# Patient Record
Sex: FEMALE | Race: WHITE | NOT HISPANIC OR LATINO | Employment: STUDENT | URBAN - METROPOLITAN AREA
[De-identification: names, ages, dates, MRNs, and addresses within clinical notes are randomized per-mention and may not be internally consistent; named-entity substitution may affect disease eponyms.]

---
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
## 2019-02-04 ENCOUNTER — CLINICAL SUPPORT (OUTPATIENT)
Dept: OBSTETRICS AND GYNECOLOGY | Facility: CLINIC | Age: 19
End: 2019-02-04
Payer: COMMERCIAL

## 2019-03-04 ENCOUNTER — CLINICAL SUPPORT (OUTPATIENT)
Dept: OBSTETRICS AND GYNECOLOGY | Facility: CLINIC | Age: 19
End: 2019-03-04
Payer: COMMERCIAL

## 2019-03-04 DIAGNOSIS — Z23 NEED FOR HPV VACCINE: Primary | ICD-10-CM

## 2019-03-04 PROCEDURE — 90471 IMMUNIZATION ADMIN: CPT | Performed by: NURSE PRACTITIONER

## 2019-03-04 PROCEDURE — 90651 9VHPV VACCINE 2/3 DOSE IM: CPT | Performed by: NURSE PRACTITIONER

## 2019-07-03 PROBLEM — H52.13 MYOPIA OF BOTH EYES: Status: ACTIVE | Noted: 2018-12-17

## 2019-07-03 PROBLEM — E66.9 OBESITY: Status: ACTIVE | Noted: 2017-04-07

## 2019-07-03 PROBLEM — H52.222 REGULAR ASTIGMATISM, LEFT EYE: Status: ACTIVE | Noted: 2018-12-17

## 2019-07-03 NOTE — PROGRESS NOTES
Visit Date: 2019   Leia Lawler is 18 y.o. female presenting today for Follow-up (BP OCP Check)     Pt has hx significant for benign brain tumor, s/p removal, has seizures as result. On oxcarbazepine for seizure control. Was previously taking OCPs (Ogestrel - 50mcg estradiol) for painful periods and acne, however due to seizure meds, OCPs were not effective and she experienced daily BTB. Pt d/c OCPs, then had worsened acne and prolonged menses. Started contraceptive patch for control of menses/acne in 2018. Here for f/u. Pt states she tried the patch x 2mo, no relief of her sx. D/c the patch, now on nothing.    Menses are regular, however they either last 5 days and are very heavy or are light and last 10 days. Cramping isn't as bad as before, but it is still present.    The following have been reviewed and updated as appropriate in this visit:      BP (!) 90/58   LMP 2019   Menstrual History:  OB History      Para Term  AB Living    0 0 0 0 0 0    SAB TAB Ectopic Multiple Live Births    0 0 0 0 0         Patient's last menstrual period was 2019.       Medications:   Current Outpatient Prescriptions:   •  calcium carbonate/vitamin D3 (CALTRATE 600 + D ORAL), Take 1 tablet by mouth daily., Disp: , Rfl:   •  cetirizine (ZyrTEC) 10 mg tablet, Take 10 mg by mouth daily., Disp: , Rfl:   •  levothyroxine (SYNTHROID) 25 mcg tablet, Take 25 mcg by mouth daily., Disp: , Rfl:   •  multivitamin capsule, Take 1 capsule by mouth daily., Disp: , Rfl:   •  norethindrone-ethin estradiol (ORTHO-NOVUM , ,) 1-35 mg-mcg per tablet, Take 1 tablet by mouth daily., Disp: 84 tablet, Rfl: 3  •  OXcarbazepine (TRILEPTAL) 600 mg tablet, Take 600 mg by mouth 2 (two) times a day., Disp: , Rfl:     Allergies: is allergic to cefprozil and minocycline.     Past Medical History:  has a past medical history of Ganglioglioma of brain (CMS/HCC) (HCC) (); History of papilledema; Hypothyroidism; Migraine;  and Seizures (CMS/HCC) ([facility]) (2016). She also has no past medical history of Abnormal Pap smear of cervix; Asthma; Cancer (CMS/HCC) (HCC); Deep vein thrombosis (CMS/HCC) (HCC); Diabetes mellitus (CMS/HCC) ([facility]); Endometriosis; Fibroid; or Hypertension.  Past Surgical History:  has a past surgical history that includes Brain tumor excision (2013).  Family History: family history includes Breast cancer (age of onset: 46) in her paternal grandmother; Cancer (age of onset: 66) in her maternal grandfather; Colon cancer (age of onset: 61) in her maternal grandmother; Endometriosis in her mother; Kidney cancer in her father.  Social History:  reports that she has never smoked. She has never used smokeless tobacco. She reports that she does not drink alcohol or use drugs.  HPI  Review of Systems  Physical Exam   Constitutional: She is oriented to person, place, and time. She appears well-developed and well-nourished.   HENT:   Head: Normocephalic and atraumatic.   Eyes: Pupils are equal, round, and reactive to light.   Neck: Normal range of motion.   Pulmonary/Chest: Effort normal.   Musculoskeletal: She exhibits no edema.   Neurological: She is alert and oriented to person, place, and time.   Skin: Skin is warm and dry.   Psychiatric: She has a normal mood and affect.     Diagnoses and all orders for this visit:    Menorrhagia with regular cycle (Primary)    Dysmenorrhea    Encounter for initial prescription of contraceptive pills    Other orders  -     norethindrone-ethin estradiol (ORTHO-NOVUM 1/35, 28,) 1-35 mg-mcg per tablet; Take 1 tablet by mouth daily.    - Counseled pt on options for control of heavy, painful periods. Options discussed: higher dose COCs, Nuvaring, Kyleena/Mirena IUD. Given her current use of oxcarbazepine, efficacy of pills would be lower. Information/insurance sheet provided for IUD. Risks/benefits of each discussed.  - Pt would like to proceed with OCPs. Rx sent. RTO on winter break for f/u  visit.    Return if symptoms worsen or fail to improve, for follow up visit.  Tanesha Perez, DO

## 2019-07-05 ENCOUNTER — OFFICE VISIT (OUTPATIENT)
Dept: OBSTETRICS AND GYNECOLOGY | Facility: CLINIC | Age: 19
End: 2019-07-05
Payer: COMMERCIAL

## 2019-07-05 VITALS — DIASTOLIC BLOOD PRESSURE: 58 MMHG | SYSTOLIC BLOOD PRESSURE: 90 MMHG

## 2019-07-05 DIAGNOSIS — Z30.011 ENCOUNTER FOR INITIAL PRESCRIPTION OF CONTRACEPTIVE PILLS: ICD-10-CM

## 2019-07-05 DIAGNOSIS — N92.0 MENORRHAGIA WITH REGULAR CYCLE: Primary | ICD-10-CM

## 2019-07-05 DIAGNOSIS — N94.6 DYSMENORRHEA: ICD-10-CM

## 2019-07-05 PROCEDURE — 99213 OFFICE O/P EST LOW 20 MIN: CPT | Performed by: OBSTETRICS & GYNECOLOGY

## 2019-10-31 ENCOUNTER — TELEPHONE (OUTPATIENT)
Dept: OBSTETRICS AND GYNECOLOGY | Facility: CLINIC | Age: 19
End: 2019-10-31

## 2019-12-19 NOTE — PROGRESS NOTES
2019  Leia Lawler is a 19 y.o.   female who presents for annual exam.     Stopped OCPs in September. Was getting BTB daily. BTB resolved. Since then menses q2-6wks x 10d, heavy flow, mild cramping.    Sh: single; nursing student    GYN screening history:   Last pap: never    Gyn History:    Patient's last menstrual period was 2019.    Menses: as above    Gardasil: never, declines    The patient is not sexually active. Has never been.  Current contraception: abstinence    History of abnormal Pap smear: no  History of abnormal mammogram: no   History of cysts, fibroids, STIs, etc:  no      OB History:   OB History    Para Term  AB Living   0 0 0 0 0 0   SAB TAB Ectopic Multiple Live Births   0 0 0 0 0       Allergies: Cefprozil; Fosphenytoin; and Minocycline    Prior to Admission medications    Medication Sig Start Date End Date Taking? Authorizing Provider   calcium carbonate/vitamin D3 (CALTRATE 600 + D ORAL) Take 1 tablet by mouth daily.    ProviderAlyssia MD   cetirizine (ZyrTEC) 10 mg tablet Take 10 mg by mouth daily.    ProviderAlyssia MD   levothyroxine (SYNTHROID) 25 mcg tablet Take 25 mcg by mouth daily.    Alyssia Dueñas MD   multivitamin capsule Take 1 capsule by mouth daily.    Alyssia Dueñas MD   norethindrone-ethin estradiol (ORTHO-NOVUM , ,) 1-35 mg-mcg per tablet Take 1 tablet by mouth daily. 19  Tanesha Perez DO   OXcarbazepine (TRILEPTAL) 600 mg tablet Take 600 mg by mouth 2 (two) times a day.    ProviderAlyssia MD       Medical History:   Past Medical History:   Diagnosis Date   • Ganglioglioma of brain (CMS/HCC) ([facility])     benign, s/p removal in    • History of papilledema     from tumor or meds, resolved   • Hypothyroidism    • Migraine     without auras   • Seizures (CMS/HCC) ([facility])        Surgical History:   Past Surgical History:   Procedure Laterality Date   • BRAIN TUMOR EXCISION          Social History:   Social History     Socioeconomic History   • Marital status: Single     Spouse name: Not on file   • Number of children: Not on file   • Years of education: Not on file   • Highest education level: Not on file   Occupational History   • Not on file   Social Needs   • Financial resource strain: Not on file   • Food insecurity:     Worry: Not on file     Inability: Not on file   • Transportation needs:     Medical: Not on file     Non-medical: Not on file   Tobacco Use   • Smoking status: Never Smoker   • Smokeless tobacco: Never Used   Substance and Sexual Activity   • Alcohol use: No   • Drug use: No   • Sexual activity: Never     Partners: Male     Birth control/protection: Abstinence   Lifestyle   • Physical activity:     Days per week: Not on file     Minutes per session: Not on file   • Stress: Not on file   Relationships   • Social connections:     Talks on phone: Not on file     Gets together: Not on file     Attends Holiness service: Not on file     Active member of club or organization: Not on file     Attends meetings of clubs or organizations: Not on file     Relationship status: Not on file   • Intimate partner violence:     Fear of current or ex partner: Not on file     Emotionally abused: Not on file     Physically abused: Not on file     Forced sexual activity: Not on file   Other Topics Concern   • Not on file   Social History Narrative   • Not on file        Family History:   Family History   Problem Relation Age of Onset   • Breast cancer Paternal Grandmother 46   • Colon cancer Maternal Grandmother 61   • Cancer Maternal Grandfather 66        bladder   • Kidney cancer Biological Father    • Endometriosis Biological Mother        Review of Systems and Physical Exam  The following have been reviewed and updated as appropriate in this visit: Allergies  Meds  Problems       Visit Vitals  /70   Wt 88.5 kg (195 lb)   LMP 12/18/2019   BMI 35.96 kg/m²     HPI  Review of  Systems  Physical Exam   Constitutional: She is oriented to person, place, and time. She appears well-developed and well-nourished.   Genitourinary: Vagina normal and uterus normal. Body Habitus limits findings.Pelvic exam was performed with patient in lithotomy exam position.     External female genitalia normal.   Urethral meatus normal.   Urethra normal.   Normal bladder.   Vagina normal.   Cervix exam normal.  Uterus is normal. Uterine contour is regular. Uterus is midaxial.   Adnexa normal.   HENT:   Head: Normocephalic and atraumatic.   Eyes: Pupils are equal, round, and reactive to light.   Neck: Normal range of motion.   Pulmonary/Chest: Effort normal.   Abdominal: Soft. She exhibits no distension and no mass. There is no tenderness. There is no rebound and no guarding. No hernia.   Musculoskeletal: She exhibits no edema.   Neurological: She is alert and oriented to person, place, and time.   Skin: Skin is warm and dry.   Psychiatric: She has a normal mood and affect.   Vitals reviewed.        Assessment/Plan:  Diagnoses and all orders for this visit:    Well woman exam with routine gynecological exam  - Exam: pelvic exam wnl. Deferred breast exam as pt <20yo.  - Pap: due at 19yo  - Gardasil: declines  - Contraception: abstinence. Wants mirena IUD for control of heavy, painful menses.  - STI screening: Not indicated as pt has never been SA. Encouraged consistent condom use for STI prevention.    Menorrhagia with irregular cycle  - Failed CHC, likely due to seizure medication.  - Counseled on alternative options, including nexplanon, levonorgestrel IUD, depo-provera.   - Pt wants to proceed with mirena IUD. Risks, benefits discussed.     Encounter for insertion of intrauterine contraceptive device (IUD)  - Mirena IUD inserted today without trouble.     Negative pregnancy test  -     POCT urine pregnancy test      Return in about 1 month (around 1/23/2020) for IUD string check.  Tanesha Perez,  DO

## 2019-12-23 ENCOUNTER — OFFICE VISIT (OUTPATIENT)
Dept: OBSTETRICS AND GYNECOLOGY | Facility: CLINIC | Age: 19
End: 2019-12-23
Payer: COMMERCIAL

## 2019-12-23 VITALS — WEIGHT: 195 LBS | SYSTOLIC BLOOD PRESSURE: 122 MMHG | DIASTOLIC BLOOD PRESSURE: 70 MMHG | BODY MASS INDEX: 35.96 KG/M2

## 2019-12-23 DIAGNOSIS — Z32.02 NEGATIVE PREGNANCY TEST: ICD-10-CM

## 2019-12-23 DIAGNOSIS — N92.1 MENORRHAGIA WITH IRREGULAR CYCLE: ICD-10-CM

## 2019-12-23 DIAGNOSIS — Z01.419 WELL WOMAN EXAM WITH ROUTINE GYNECOLOGICAL EXAM: Primary | ICD-10-CM

## 2019-12-23 DIAGNOSIS — Z30.430 ENCOUNTER FOR INSERTION OF INTRAUTERINE CONTRACEPTIVE DEVICE (IUD): ICD-10-CM

## 2019-12-23 LAB — PREGNANCY TEST URINE, POC: NEGATIVE

## 2019-12-23 PROCEDURE — 58300 INSERT INTRAUTERINE DEVICE: CPT | Performed by: OBSTETRICS & GYNECOLOGY

## 2019-12-23 PROCEDURE — 99395 PREV VISIT EST AGE 18-39: CPT | Mod: 25 | Performed by: OBSTETRICS & GYNECOLOGY

## 2019-12-23 PROCEDURE — 81025 URINE PREGNANCY TEST: CPT | Performed by: OBSTETRICS & GYNECOLOGY

## 2019-12-23 NOTE — PROCEDURES
IUD Insertion Ambulatory  Date/Time: 12/23/2019 1:06 PM  Performed by: Tanesha Perez DO  Authorized by: Tanesha Perez DO     Consent:     Consent obtained:  Written    Consent given by:  Patient    Procedure risks and benefits discussed: yes      Patient questions answered: yes      Patient agrees, verbalizes understanding, and wants to proceed: yes      Educational handouts given: yes      Instructions and paperwork completed: yes    Universal protocol:     Patient states understanding of procedure being performed: yes      Relevant documents present and verified: yes      Required blood products, implants, devices, and special equipment available: yes    Procedure:     Pelvic exam performed: yes      Negative GC/chlamydia test: not done because pt never SA.      Negative urine pregnancy test: yes      Cervix cleaned and prepped: yes      Speculum placed in vagina: yes      Tenaculum applied to cervix: yes      Uterus sounded: yes      Uterus sound depth (cm):  7    IUD inserted with no complications: yes      IUD type:  Mirena    Strings trimmed: yes    Post-procedure:     Patient tolerated procedure well: yes      Patient will follow up after next period: yes      Estimated blood loss: no blood loss

## 2020-01-03 NOTE — PROGRESS NOTES
Visit Date: 2020   Leia Lawler is 19 y.o. female presenting today for Follow-up (string check)    Mirena IUD inserted for heavy, painful periods 19. Here for string check. Since insertion, she has had some mild cramping and minimal brown discharge. No other c/o.    The following have been reviewed and updated as appropriate in this visit: Problems       Visit Vitals  /70   Wt 87.1 kg (192 lb)   LMP 2019   BMI 35.40 kg/m²     Menstrual History:  OB History        0    Para   0    Term   0       0    AB   0    Living   0       SAB   0    TAB   0    Ectopic   0    Multiple   0    Live Births   0                Patient's last menstrual period was 2019.       Medications:   Current Outpatient Medications:   •  cetirizine (ZyrTEC) 10 mg tablet, Take 10 mg by mouth daily., Disp: , Rfl:   •  levonorgestrel (MIRENA) 20 mcg/24 hours (5 yrs) 52 mg intrauterine device, 1 each by intrauterine route once., Disp: , Rfl:   •  levothyroxine (SYNTHROID) 25 mcg tablet, Take 25 mcg by mouth daily., Disp: , Rfl:   •  OXcarbazepine (TRILEPTAL) 600 mg tablet, Take 600 mg by mouth 2 (two) times a day., Disp: , Rfl:     Allergies: is allergic to cefprozil; fosphenytoin; and minocycline.     Past Medical History:  has a past medical history of Ganglioglioma of brain (CMS/HCC) ([facility]) (), History of papilledema, Hypothyroidism, Migraine, and Seizures (CMS/HCC) ([facility]) (). She also has no past medical history of Abnormal Pap smear of cervix, Asthma, Cancer (CMS/HCC) ([facility]), Deep vein thrombosis (CMS/HCC) ([facility]), Diabetes mellitus (CMS/HCC) ([facility]), Endometriosis, Fibroid, or Hypertension.  Past Surgical History:  has a past surgical history that includes Brain tumor excision ().  Family History: family history includes Breast cancer (age of onset: 46) in her paternal grandmother; Cancer (age of onset: 66) in her maternal grandfather; Colon cancer (age of onset: 61) in her maternal grandmother;  Endometriosis in her biological mother; Kidney cancer in her biological father.  Social History:  reports that she has never smoked. She has never used smokeless tobacco. She reports that she does not drink alcohol or use drugs.      HPI  Review of Systems  Physical Exam   Constitutional: She is oriented to person, place, and time. She appears well-developed and well-nourished.   Genitourinary: Vagina normal. Pelvic exam was performed with patient in lithotomy exam position.     External female genitalia normal.   Urethral meatus normal.   Urethra normal.   Normal bladder.   Vagina normal.   Cervix exam normal.  Cervix exhibits visible IUD strings.   HENT:   Head: Normocephalic and atraumatic.   Eyes: Pupils are equal, round, and reactive to light.   Neck: Normal range of motion.   Pulmonary/Chest: Effort normal.   Musculoskeletal: She exhibits no edema.   Neurological: She is alert and oriented to person, place, and time.   Skin: Skin is warm and dry.   Psychiatric: She has a normal mood and affect.   Vitals reviewed.    Diagnoses and all orders for this visit:    IUD check up (Primary)    - IUD strings visualized.   - Continue mirena IUD.    Return in about 8 months (around 9/15/2020) for Annual visit.  Tanesha Perez, DO

## 2020-01-08 ENCOUNTER — OFFICE VISIT (OUTPATIENT)
Dept: OBSTETRICS AND GYNECOLOGY | Facility: CLINIC | Age: 20
End: 2020-01-08
Payer: COMMERCIAL

## 2020-01-08 VITALS — BODY MASS INDEX: 35.4 KG/M2 | WEIGHT: 192 LBS | SYSTOLIC BLOOD PRESSURE: 114 MMHG | DIASTOLIC BLOOD PRESSURE: 70 MMHG

## 2020-01-08 DIAGNOSIS — Z30.431 IUD CHECK UP: Primary | ICD-10-CM

## 2020-01-08 PROCEDURE — 99213 OFFICE O/P EST LOW 20 MIN: CPT | Performed by: OBSTETRICS & GYNECOLOGY

## 2020-06-01 ENCOUNTER — TELEPHONE (OUTPATIENT)
Dept: HEMATOLOGY/ONCOLOGY | Facility: HOSPITAL | Age: 20
End: 2020-06-01

## 2020-06-01 ENCOUNTER — CLINICAL SUPPORT (OUTPATIENT)
Dept: OTHER | Facility: CLINIC | Age: 20
End: 2020-06-01
Attending: NURSE PRACTITIONER
Payer: COMMERCIAL

## 2020-06-01 DIAGNOSIS — R50.9 FEVER, UNSPECIFIED FEVER CAUSE: ICD-10-CM

## 2020-06-01 DIAGNOSIS — R50.9 FEVER, UNSPECIFIED FEVER CAUSE: Primary | ICD-10-CM

## 2020-06-01 NOTE — TELEPHONE ENCOUNTER
Please schedule this patient for Covid 19 testing.  I have updated the patient's demographic information with the patient's contact information for scheduling.    Patient having symptoms?: yes  If yes, list symptoms:Aches     If N/A, this patient is scheduled for surgery/procedure.    The patient will be having procedure at: N/A   The scheduled date for the procedure is: n/a    This provider will be placing the order in Epic: no  If no, this provider was directed to fax the order to [phone]: Yes    Ordering provider (First - Last): dr. bryan  Provider Best Callback #:[phone]  Fax number (If provider wants results and does not use In Basket): [phone]     This patient is a [employer] Employee: YES/NO/NA: No  If yes: Hospital/Facility & Unit/Department & Job Title:

## 2020-06-02 NOTE — PROGRESS NOTES
Patient was processed today at [facility] drive-up clinic.  Pt denies any sort of medical distress today.  After identifying the patient, a nasopharyngeal sample was obtained and placed in viral transport medium.  Pt was given a post-collection education sheet and encouraged to self-isolate until they receive the results.  Pt directed to [facility] website for [facility] Privacy Practices.  Sample sent to the lab noted on the order and requisition.  Pt was without additional questions or concerns and was dispositioned from the testing area to home.

## 2020-06-03 LAB — SARS-COV-2 RNA RESP QL NAA+PROBE: NOT DETECTED

## 2020-07-31 ENCOUNTER — OFFICE VISIT (OUTPATIENT)
Dept: OBSTETRICS AND GYNECOLOGY | Facility: CLINIC | Age: 20
End: 2020-07-31
Payer: COMMERCIAL

## 2020-07-31 VITALS
BODY MASS INDEX: 34.96 KG/M2 | HEIGHT: 62 IN | WEIGHT: 190 LBS | DIASTOLIC BLOOD PRESSURE: 80 MMHG | SYSTOLIC BLOOD PRESSURE: 120 MMHG

## 2020-07-31 DIAGNOSIS — Z11.3 ROUTINE SCREENING FOR STI (SEXUALLY TRANSMITTED INFECTION): ICD-10-CM

## 2020-07-31 DIAGNOSIS — N92.1 BREAKTHROUGH BLEEDING ASSOCIATED WITH INTRAUTERINE DEVICE (IUD): Primary | ICD-10-CM

## 2020-07-31 DIAGNOSIS — Z97.5 BREAKTHROUGH BLEEDING ASSOCIATED WITH INTRAUTERINE DEVICE (IUD): Primary | ICD-10-CM

## 2020-07-31 PROCEDURE — 99213 OFFICE O/P EST LOW 20 MIN: CPT | Performed by: OBSTETRICS & GYNECOLOGY

## 2020-07-31 RX ORDER — AMMONIUM LACTATE 12 G/100G
LOTION TOPICAL
COMMUNITY

## 2020-07-31 NOTE — PROGRESS NOTES
"Visit Date: 2020   Leia Lawler is 19 y.o. female presenting today for IUD Dec. still spotting    Since insertion, didn't have any bleeding for 1-2 months, then started again after that. Bleeding occurs monthly, few times per month, can last up to a few weeks. Comes randomly. Flow is light, mostly brown discharge.    The following have been reviewed and updated as appropriate in this visit: Tobacco  Allergies  Meds  Problems  Med Hx  Surg Hx  Fam Hx  Soc Hx        Visit Vitals  /80   Ht 1.575 m (5' 2\")   Wt 86.2 kg (190 lb)   BMI 34.75 kg/m²     Menstrual History:  OB History        0    Para   0    Term   0       0    AB   0    Living   0       SAB   0    TAB   0    Ectopic   0    Multiple   0    Live Births   0                No LMP recorded. Patient has had an implant.       Medications:   Current Outpatient Medications:   •  ammonium lactate (LAC-HYDRIN) 12 % lotion, AmLactin 12 % lotion  APPLY TO ARMS TWICE DAILY AS DIRECTED, Disp: , Rfl:   •  levonorgestrel (MIRENA) 20 mcg/24 hours (5 yrs) 52 mg intrauterine device, 1 each by intrauterine route once., Disp: , Rfl:   •  levothyroxine (SYNTHROID) 25 mcg tablet, Take 25 mcg by mouth daily., Disp: , Rfl:   •  OXcarbazepine (TRILEPTAL) 600 mg tablet, Take 600 mg by mouth 2 (two) times a day., Disp: , Rfl:   •  cetirizine (ZyrTEC) 10 mg tablet, Take 10 mg by mouth daily., Disp: , Rfl:     Allergies: is allergic to cefprozil; fosphenytoin; and minocycline.     Past Medical History:  has a past medical history of Ganglioglioma of brain (CMS/HCC) (), History of papilledema, Hypothyroidism, Migraine, and Seizures (CMS/HCC) (2016). She also has no past medical history of Abnormal Pap smear of cervix, Asthma, Cancer (CMS/HCC), Deep vein thrombosis (CMS/HCC), Diabetes mellitus (CMS/HCC), Endometriosis, Fibroid, or Hypertension.  Past Surgical History:  has a past surgical history that includes Brain tumor excision ().  Family History: " family history includes Breast cancer (age of onset: 46) in her paternal grandmother; Cancer (age of onset: 66) in her maternal grandfather; Colon cancer (age of onset: 61) in her maternal grandmother; Endometriosis in her biological mother; Kidney cancer in her biological father.  Social History:  reports that she has never smoked. She has never used smokeless tobacco. She reports that she does not drink alcohol or use drugs.      HPI  Review of Systems  Physical Exam   Constitutional: She is oriented to person, place, and time. She appears well-developed and well-nourished.   Genitourinary: Vagina normal and uterus normal. Pelvic exam was performed with patient in lithotomy exam position.     External female genitalia normal.   Urethral meatus normal.   Urethra normal.   Normal bladder.   Vagina normal. No bleeding in the vagina.   Cervix exam normal.  Cervix exhibits visible IUD strings.   Uterus is normal. Uterine contour is regular. Uterus is anteverted.   Adnexa normal.   HENT:   Head: Normocephalic and atraumatic.   Eyes: Pupils are equal, round, and reactive to light.   Neck: Normal range of motion.   Pulmonary/Chest: Effort normal.   Abdominal: Soft. She exhibits no distension and no mass. There is no tenderness. There is no rebound and no guarding. No hernia.   Musculoskeletal: She exhibits no edema.   Neurological: She is alert and oriented to person, place, and time.   Skin: Skin is warm and dry.   Psychiatric: She has a normal mood and affect.   Vitals reviewed.    Diagnoses and all orders for this visit:    Breakthrough bleeding associated with intrauterine device (IUD) (Primary)  - Exam wnl. Strings visualized.  - GC/CT sent.  -     US PELVIS TRANSABDOMINAL & TRANSVAGINAL; Future  - Likely nl bleeding with mirena IUD.    Routine screening for STI (sexually transmitted infection)  -     Chlamydia/GC RNA:ThinPrep/Urine/Swab    Return in about 1 year (around 7/31/2021), or if symptoms worsen or fail to  improve, for Annual visit.  Tanesha Perez, DO

## 2020-08-05 ENCOUNTER — HOSPITAL ENCOUNTER (OUTPATIENT)
Dept: RADIOLOGY | Age: 20
Discharge: HOME | End: 2020-08-05
Attending: OBSTETRICS & GYNECOLOGY
Payer: COMMERCIAL

## 2020-08-05 DIAGNOSIS — Z97.5 BREAKTHROUGH BLEEDING ASSOCIATED WITH INTRAUTERINE DEVICE (IUD): ICD-10-CM

## 2020-08-05 DIAGNOSIS — N92.1 BREAKTHROUGH BLEEDING ASSOCIATED WITH INTRAUTERINE DEVICE (IUD): ICD-10-CM

## 2020-08-05 PROCEDURE — 76830 TRANSVAGINAL US NON-OB: CPT

## 2021-01-18 ENCOUNTER — IMMUNIZATIONS (OUTPATIENT)
Dept: FAMILY MEDICINE CLINIC | Facility: HOSPITAL | Age: 21
End: 2021-01-18

## 2021-01-18 DIAGNOSIS — Z23 ENCOUNTER FOR IMMUNIZATION: Primary | ICD-10-CM

## 2021-01-18 PROCEDURE — 0011A SARS-COV-2 / COVID-19 MRNA VACCINE (MODERNA) 100 MCG: CPT

## 2021-01-18 PROCEDURE — 91301 SARS-COV-2 / COVID-19 MRNA VACCINE (MODERNA) 100 MCG: CPT

## 2021-02-15 ENCOUNTER — IMMUNIZATIONS (OUTPATIENT)
Dept: FAMILY MEDICINE CLINIC | Facility: HOSPITAL | Age: 21
End: 2021-02-15

## 2021-02-15 DIAGNOSIS — Z23 ENCOUNTER FOR IMMUNIZATION: Primary | ICD-10-CM

## 2021-02-15 PROCEDURE — 0012A SARS-COV-2 / COVID-19 MRNA VACCINE (MODERNA) 100 MCG: CPT

## 2021-02-15 PROCEDURE — 91301 SARS-COV-2 / COVID-19 MRNA VACCINE (MODERNA) 100 MCG: CPT

## 2021-10-28 ENCOUNTER — IMMUNIZATIONS (OUTPATIENT)
Dept: FAMILY MEDICINE CLINIC | Facility: MEDICAL CENTER | Age: 21
End: 2021-10-28

## 2021-10-28 DIAGNOSIS — Z23 ENCOUNTER FOR IMMUNIZATION: Primary | ICD-10-CM

## 2022-03-31 ENCOUNTER — TELEPHONE (OUTPATIENT)
Dept: OBSTETRICS AND GYNECOLOGY | Facility: CLINIC | Age: 22
End: 2022-03-31
Payer: COMMERCIAL

## 2022-03-31 ENCOUNTER — OFFICE VISIT (OUTPATIENT)
Dept: OBSTETRICS AND GYNECOLOGY | Facility: CLINIC | Age: 22
End: 2022-03-31
Payer: COMMERCIAL

## 2022-03-31 VITALS — WEIGHT: 210.6 LBS | DIASTOLIC BLOOD PRESSURE: 82 MMHG | BODY MASS INDEX: 38.52 KG/M2 | SYSTOLIC BLOOD PRESSURE: 126 MMHG

## 2022-03-31 DIAGNOSIS — Z30.432 ENCOUNTER FOR IUD REMOVAL: Primary | ICD-10-CM

## 2022-03-31 PROCEDURE — 58301 REMOVE INTRAUTERINE DEVICE: CPT | Performed by: NURSE PRACTITIONER

## 2022-03-31 PROCEDURE — 99999 PR OFFICE/OUTPT VISIT,PROCEDURE ONLY: CPT | Performed by: NURSE PRACTITIONER

## 2022-03-31 RX ORDER — ASPIRIN 325 MG
TABLET, DELAYED RELEASE (ENTERIC COATED) ORAL
COMMUNITY
Start: 2022-01-19

## 2022-03-31 NOTE — PROCEDURES
IUD Removal Ambulatory    Date/Time: 3/31/2022 4:53 PM  Performed by: Celia Calixto CRNP  Authorized by: Celia Calixto CRNP     Consent:     Consent obtained:  Verbal and written    Consent given by:  Patient    Procedure risks and benefits discussed: yes      Patient questions answered: yes      Patient agrees, verbalizes understanding, and wants to proceed: yes    Procedure:     Removed with no complications: yes      Estimated blood loss: minimal  Comments:      Notified pt that she can get pregnant now, to use condoms

## 2022-04-04 NOTE — PROGRESS NOTES
"Subjective      Patient ID: Leia Lawler is a 21 y.o.       No LMP recorded (lmp unknown). Patient has had an implant.    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   Genitourinary:      Urethra, vagina, cervix and urethral meatus normal.     External female genitalia normal.   Urethral meatus normal.   Urethra normal.   Vagina normal.   Cervix exam normal.Rectovaginal exam normal. Abdominal:      General: There is no distension.      Palpations: Abdomen is soft.   Skin:     General: Skin is warm.      Findings: No rash.           Assessment/Plan     IUD removed without difficulty: see procedure note  Offered another form of birth control, pt will think about this, but will use condoms right now  RTO: AV, already scheduled for 5/3/2022 with Dr. CALIX and will discuss possible starting another form of birth control      "
I was immediately available to provide supervision and direction for the care of the patient.  
100

## 2022-04-29 NOTE — PROGRESS NOTES
5/3/2022  Leia Lawler is a 21 y.o.   female who presents for annual exam.     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  Marital status: single    GYN screening history:   Last pap: never  Last mammo: never  Last colonoscopy: never  Last DEXA: never      Gyn History:    Patient's last menstrual period was 2022 (exact date).    Menses: had minimal spotting with mirena in place, had postcoital bleeding with mirena in place, was removed 3/31/22, had menses 4/3/22 x 10 days, heavy flow x 6d, had to change pad q4-5hr, no IMB, no pelvic pain    Gardasil: never, declines    The patient is sexually active. Prefers men.  Current contraception: condoms    History of abnormal Pap smear: no  History of abnormal mammogram: no   History of cysts, fibroids, STIs, etc:  no      OB History:   OB History    Para Term  AB Living   0 0 0 0 0 0   SAB IAB Ectopic Multiple Live Births   0 0 0 0 0       Allergies: Cefprozil, Fosphenytoin, and Minocycline    Prior to Admission medications    Medication Sig Start Date End Date Taking? Authorizing Provider   ammonium lactate (LAC-HYDRIN) 12 % lotion AmLactin 12 % lotion   APPLY TO ARMS TWICE DAILY AS DIRECTED    Alyssia Dueñas MD   cetirizine (ZyrTEC) 10 mg tablet Take 10 mg by mouth daily.    Alyssia Dueñas MD   cholecalciferol (VITAMIN D3) 1,250 mcg (50,000 unit) capsule TAKE 1 CAPSULE BY MOUTH ONE TIME PER WEEK 22   Alyssia Dueñas MD   levonorgestrel (MIRENA) 20 mcg/24 hours (5 yrs) 52 mg intrauterine device 1 each by intrauterine route once. 19   Alyssia Dueñas MD   levothyroxine (SYNTHROID) 25 mcg tablet Take 25 mcg by mouth daily.    Alyssia Dueñas MD   OXcarbazepine (TRILEPTAL) 600 mg tablet Take 600 mg by mouth 2 (two) times a day.    Alyssia Dueñas MD       Medical History:   Past Medical History:   Diagnosis Date   • Ganglioglioma of brain (CMS/[facility])      "benign, s/p removal in 2013   • History of papilledema     from tumor or meds, resolved   • Hypothyroidism    • Migraine     without auras   • Seizures (CMS/HCC) 2016       Surgical History:   Past Surgical History:   Procedure Laterality Date   • BRAIN TUMOR EXCISION  2013   • WISDOM TOOTH EXTRACTION         Social History:   Social History     Socioeconomic History   • Marital status: Single     Spouse name: None   • Number of children: None   • Years of education: None   • Highest education level: None   Occupational History   • Occupation: RN   Tobacco Use   • Smoking status: Never Smoker   • Smokeless tobacco: Never Used   Vaping Use   • Vaping Use: Never used   Substance and Sexual Activity   • Alcohol use: No   • Drug use: No   • Sexual activity: Yes     Partners: Male     Birth control/protection: Condom        Family History:   Family History   Problem Relation Age of Onset   • Breast cancer Paternal Grandmother 46   • Colon cancer Maternal Grandmother 61   • Cancer Maternal Grandfather 66        bladder   • Kidney cancer Biological Father    • Breast cancer Biological Mother 55   • Endometriosis Biological Mother    • No Known Problems Biological Brother        Review of Systems and Physical Exam  The following have been reviewed and updated as appropriate in this visit:  Tobacco  Allergies  Meds  Problems  Med Hx  Surg Hx  Fam Hx  Soc   Hx        Visit Vitals  /78   Ht 1.575 m (5' 2\")   Wt 94.7 kg (208 lb 12.8 oz)   LMP 04/03/2022 (Exact Date)   BMI 38.19 kg/m²     HPI  Review of Systems  Physical Exam  Constitutional:       Appearance: Normal appearance. She is well-developed.   Genitourinary:      Pelvic exam was performed with patient in the lithotomy position.      Urethra, vagina, cervix, uterus and urethral meatus normal.   Pelvic exam was performed with patient in lithotomy exam position.     External female genitalia normal.   Urethral meatus normal.   Urethra normal.   Normal " bladder.   Vagina normal.   Cervix exam normal.  Uterus is normal.   Adnexa normal. HENT:      Head: Normocephalic and atraumatic.   Eyes:      Pupils: Pupils are equal, round, and reactive to light.   Pulmonary:      Effort: Pulmonary effort is normal.   Chest:   Breasts: Breasts are symmetrical.      Right: Normal. No swelling, bleeding, inverted nipple, mass, nipple discharge, skin change, tenderness or axillary adenopathy.      Left: Normal. No swelling, bleeding, inverted nipple, mass, nipple discharge, skin change, tenderness or axillary adenopathy.       Abdominal:      General: There is no distension.      Palpations: Abdomen is soft. There is no mass.      Tenderness: There is no abdominal tenderness. There is no guarding or rebound.      Hernia: No hernia is present.   Musculoskeletal:      Cervical back: Normal range of motion.      Right lower leg: No edema.      Left lower leg: No edema.   Lymphadenopathy:      Upper Body:      Right upper body: No axillary adenopathy.      Left upper body: No axillary adenopathy.   Neurological:      General: No focal deficit present.      Mental Status: She is alert and oriented to person, place, and time.   Skin:     General: Skin is warm and dry.   Psychiatric:         Mood and Affect: Mood normal.         Behavior: Behavior normal.   Vitals reviewed.           Assessment/Plan:  Diagnoses and all orders for this visit:    Well woman exam with routine gynecological exam     - Exam: wnl  - PAP W/REFLEX HR HPV AND CT/NG  - Gardasil: declines  - Contraception: condoms. Wants to start NuvaRing. No contraindications. Counseled on use. Rx sent: etonogestreL-ethinyl estradioL (NUVARING) 0.12-0.015 mg/24 hr vaginal ring; Insert 1 each into the vagina every 28 (twentyeight) days. Insert vaginally and leave in for 3 consecutive weeks, then remove for 1 week.  - Discussed breast awareness.    Routine screening for STI (sexually transmitted infection)  -     PAP W/REFLEX HR HPV  AND CT/NG  - Not interested in serum STI screening at this moment.    Family history of breast cancer  - Mother dx with breast CA in her 50s. Plans genetic testing soon.  - Discussed self breast exams.  - If mother's genetic testing is negative, will start with yearly mammography at 41yo or younger if she develops any changes with her breasts.    Return in about 3 months (around 8/3/2022) for Contraception follow up, blood pressure check.  Tanesha Perez, DO

## 2022-05-03 ENCOUNTER — OFFICE VISIT (OUTPATIENT)
Dept: OBSTETRICS AND GYNECOLOGY | Facility: CLINIC | Age: 22
End: 2022-05-03
Payer: COMMERCIAL

## 2022-05-03 VITALS
BODY MASS INDEX: 38.42 KG/M2 | WEIGHT: 208.8 LBS | SYSTOLIC BLOOD PRESSURE: 118 MMHG | HEIGHT: 62 IN | DIASTOLIC BLOOD PRESSURE: 78 MMHG

## 2022-05-03 DIAGNOSIS — Z80.3 FAMILY HISTORY OF BREAST CANCER: ICD-10-CM

## 2022-05-03 DIAGNOSIS — Z01.419 WELL WOMAN EXAM WITH ROUTINE GYNECOLOGICAL EXAM: Primary | ICD-10-CM

## 2022-05-03 DIAGNOSIS — Z11.3 ROUTINE SCREENING FOR STI (SEXUALLY TRANSMITTED INFECTION): ICD-10-CM

## 2022-05-03 PROCEDURE — 99395 PREV VISIT EST AGE 18-39: CPT | Performed by: OBSTETRICS & GYNECOLOGY

## 2022-05-03 PROCEDURE — 3008F BODY MASS INDEX DOCD: CPT | Performed by: OBSTETRICS & GYNECOLOGY

## 2022-05-03 RX ORDER — ETONOGESTREL AND ETHINYL ESTRADIOL VAGINAL RING .015; .12 MG/D; MG/D
1 RING VAGINAL
Qty: 3 EACH | Refills: 3 | Status: SHIPPED | OUTPATIENT
Start: 2022-05-03 | End: 2023-05-03

## 2022-05-11 LAB
C TRACH RRNA SPEC QL NAA+PROBE: NOT DETECTED
CLINICAL INFO: ABNORMAL
CYTO CVX: ABNORMAL
CYTOLOGIST CVX/VAG CYTO: ABNORMAL
CYTOLOGY CMNT CVX/VAG CYTO-IMP: ABNORMAL
DATE OF PREVIOUS PAP: ABNORMAL
DATE PREVIOUS BX: ABNORMAL
GEN CATEG CVX/VAG CYTO-IMP: ABNORMAL
HPV E6+E7 MRNA CVX QL NAA+PROBE: NOT DETECTED
LMP START DATE: ABNORMAL
N GONORRHOEA RRNA SPEC QL NAA+PROBE: NOT DETECTED
PATHOLOGIST CVX/VAG CYTO: ABNORMAL
QST (ALWAYS MESSAGE): ABNORMAL
QUEST COMMENT (PAP): ABNORMAL
SPECIMEN SOURCE CVX/VAG CYTO: ABNORMAL
STAT OF ADQ CVX/VAG CYTO-IMP: ABNORMAL

## 2022-05-12 ENCOUNTER — TELEPHONE (OUTPATIENT)
Dept: OBSTETRICS AND GYNECOLOGY | Facility: CLINIC | Age: 22
End: 2022-05-12
Payer: COMMERCIAL

## 2022-05-12 NOTE — TELEPHONE ENCOUNTER
Spoke with pt re: pap result: ASCUS, negative HPV. Given negative HPV, this is reassuring result. Reviewed ASCCP guidelines with pt. Can repeat pap 3 years given negative HPV, although guidelines recommend repeat in 1yr if HPV unknown. Pt prefers repeat pap in 1yr. Will repeat at next annual visit. Pt also notes she hasn't gotten a period since 4/3, has had two negative home UPT. Using condoms for BCM. Hasn't started NuvaRing yet. She can start NuvaRing right away, doesn't need to wait for period, however she prefers to wait to start NuvaRing to ensure she is not amenorrheic. Pt to notify me if she doesn't get period in 3mo. RTO prn or for annual.

## 2022-06-20 ENCOUNTER — NURSE TRIAGE (OUTPATIENT)
Dept: OBSTETRICS AND GYNECOLOGY | Facility: CLINIC | Age: 22
End: 2022-06-20
Payer: COMMERCIAL

## 2022-06-20 ENCOUNTER — APPOINTMENT (OUTPATIENT)
Dept: LAB | Facility: CLINIC | Age: 22
End: 2022-06-20

## 2022-06-20 ENCOUNTER — APPOINTMENT (OUTPATIENT)
Dept: URGENT CARE | Facility: CLINIC | Age: 22
End: 2022-06-20

## 2022-06-20 DIAGNOSIS — Z02.1 PHYSICAL EXAM, PRE-EMPLOYMENT: Primary | ICD-10-CM

## 2022-06-20 DIAGNOSIS — Z02.1 PHYSICAL EXAM, PRE-EMPLOYMENT: ICD-10-CM

## 2022-06-20 PROCEDURE — 86480 TB TEST CELL IMMUN MEASURE: CPT

## 2022-06-20 PROCEDURE — 86765 RUBEOLA ANTIBODY: CPT

## 2022-06-20 PROCEDURE — 86735 MUMPS ANTIBODY: CPT

## 2022-06-20 PROCEDURE — 86787 VARICELLA-ZOSTER ANTIBODY: CPT

## 2022-06-20 PROCEDURE — 36415 COLL VENOUS BLD VENIPUNCTURE: CPT

## 2022-06-20 PROCEDURE — 86762 RUBELLA ANTIBODY: CPT

## 2022-06-20 NOTE — TELEPHONE ENCOUNTER
Spoke to pt     Pt reported:  IUD removed 3/31  No period for 6 weeks     Period Started 5/13   Full bleed for 1-20 days then light to spotting  Now: around day 30 full period again   Never started Nuvaring    Pt agreed to be seen Wednesday     No pain   No Sob     Pt will call back with worsening symptoms

## 2022-06-21 LAB
MEV IGG SER QL: NORMAL
MUV IGG SER QL: NORMAL
RUBV IGG SERPL IA-ACNC: 133.4 IU/ML
VZV IGG SER IA-ACNC: NORMAL

## 2022-06-21 NOTE — PROGRESS NOTES
Visit Date: 2022   Leia Lawler is 21 y.o. female presenting today for AUB    Cycle hx: h/o monthly cycles w 10 day bleeding interval     IUD removed 3/31 (felt it and hair loss)   LMP: , UPT neg   Bled for 20 days   Resumed bleeding: beginning of      Dx w hypothyroid,  Admits to compliance issues w meds   Gain 15 lbs since 2021     Pain:  No   SA: yes no c/o using condoms     H/O BTB w OC use   US done 2020: normal       The following have been reviewed and updated as appropriate in this visit:         There were no vitals taken for this visit.  Menstrual History:  OB History        0    Para   0    Term   0       0    AB   0    Living   0       SAB   0    IAB   0    Ectopic   0    Multiple   0    Live Births   0                No LMP recorded.       Medications:   Current Outpatient Medications:   •  ammonium lactate (LAC-HYDRIN) 12 % lotion, AmLactin 12 % lotion  APPLY TO ARMS TWICE DAILY AS DIRECTED, Disp: , Rfl:   •  cetirizine (ZyrTEC) 10 mg tablet, Take 10 mg by mouth daily., Disp: , Rfl:   •  cholecalciferol (VITAMIN D3) 1,250 mcg (50,000 unit) capsule, TAKE 1 CAPSULE BY MOUTH ONE TIME PER WEEK, Disp: , Rfl:   •  etonogestreL-ethinyl estradioL (NUVARING) 0.12-0.015 mg/24 hr vaginal ring, Insert 1 each into the vagina every 28 (twentyeight) days. Insert vaginally and leave in for 3 consecutive weeks, then remove for 1 week., Disp: 3 each, Rfl: 3  •  levothyroxine (SYNTHROID) 25 mcg tablet, Take 25 mcg by mouth daily., Disp: , Rfl:   •  OXcarbazepine (TRILEPTAL) 600 mg tablet, Take 600 mg by mouth 2 (two) times a day., Disp: , Rfl:     Allergies: is allergic to cefprozil, fosphenytoin, and minocycline.     Past Medical History:  has a past medical history of Ganglioglioma of brain (CMS/[facility]) (), History of papilledema, Hypothyroidism, Migraine, and Seizures (CMS/[facility]) (2016).    She has no past medical history of Abnormal Pap smear of cervix, Asthma, Cancer (CMS/[facility]), Deep  vein thrombosis (CMS/HCC), Diabetes mellitus (CMS/HCC), Endometriosis, Fibroid, or Hypertension.  Past Surgical History:  has a past surgical history that includes Brain tumor excision (2013) and [place] tooth extraction.  Family History: family history includes Breast cancer (age of onset: 46) in her paternal grandmother; Breast cancer (age of onset: 55) in her biological mother; Cancer (age of onset: 66) in her maternal grandfather; Colon cancer (age of onset: 61) in her maternal grandmother; Endometriosis in her biological mother; Kidney cancer in her biological father; No Known Problems in her biological brother.  Social History:   Social History     Tobacco Use   • Smoking status: Never Smoker   • Smokeless tobacco: Never Used   Vaping Use   • Vaping Use: Never used   Substance Use Topics   • Alcohol use: No   • Drug use: No         HPI  Review of Systems   Genitourinary: Positive for irregular menses.     Physical Exam  Genitourinary:      Vagina, cervix and uterus normal.     External female genitalia normal.   Vagina normal.   Cervix exam normal.  Uterus is normal.   Adnexa normal.      Exam: normal  AUB: begin aygestin taper  B/W for TSH and poss PCOS ordered  To begin Ring w aygestin withdraw bleed        No follow-ups on file.  TACHO Rivera

## 2022-06-22 ENCOUNTER — APPOINTMENT (OUTPATIENT)
Dept: LAB | Age: 22
End: 2022-06-22
Attending: NURSE PRACTITIONER
Payer: COMMERCIAL

## 2022-06-22 ENCOUNTER — OFFICE VISIT (OUTPATIENT)
Dept: OBSTETRICS AND GYNECOLOGY | Facility: CLINIC | Age: 22
End: 2022-06-22
Payer: COMMERCIAL

## 2022-06-22 VITALS
HEIGHT: 62 IN | BODY MASS INDEX: 38.64 KG/M2 | DIASTOLIC BLOOD PRESSURE: 70 MMHG | SYSTOLIC BLOOD PRESSURE: 118 MMHG | WEIGHT: 210 LBS

## 2022-06-22 DIAGNOSIS — N93.9 ABNORMAL UTERINE AND VAGINAL BLEEDING, UNSPECIFIED: ICD-10-CM

## 2022-06-22 DIAGNOSIS — N93.9 ABNORMAL UTERINE BLEEDING (AUB): Primary | ICD-10-CM

## 2022-06-22 LAB
GAMMA INTERFERON BACKGROUND BLD IA-ACNC: 0.02 IU/ML
M TB IFN-G BLD-IMP: NEGATIVE
M TB IFN-G CD4+ BCKGRND COR BLD-ACNC: -0.01 IU/ML
M TB IFN-G CD4+ BCKGRND COR BLD-ACNC: 0 IU/ML
MITOGEN IGNF BCKGRD COR BLD-ACNC: >10 IU/ML

## 2022-06-22 PROCEDURE — 30099997 SPECIMEN PROCESSING

## 2022-06-22 PROCEDURE — 3008F BODY MASS INDEX DOCD: CPT | Performed by: NURSE PRACTITIONER

## 2022-06-22 PROCEDURE — 99213 OFFICE O/P EST LOW 20 MIN: CPT | Performed by: NURSE PRACTITIONER

## 2022-06-22 RX ORDER — NORETHINDRONE 5 MG/1
TABLET ORAL
Qty: 30 TABLET | Refills: 0 | Status: SHIPPED | OUTPATIENT
Start: 2022-06-22

## 2022-06-23 ENCOUNTER — TELEPHONE (OUTPATIENT)
Dept: OBSTETRICS AND GYNECOLOGY | Facility: CLINIC | Age: 22
End: 2022-06-23
Payer: COMMERCIAL

## 2022-06-23 NOTE — TELEPHONE ENCOUNTER
Spoke w pt Reviewed results so far. Pharm is waiting on aygestin delivery. Pt states bleeding is slowing down. I will call next week w final b/w result s

## 2022-06-27 LAB
B-HCG SERPL-ACNC: <3 MIU/ML
ERYTHROCYTE [DISTWIDTH] IN BLOOD BY AUTOMATED COUNT: 12.3 % (ref 11–15)
ESTRADIOL SERPL-MCNC: 55 PG/ML
FSH SERPL-ACNC: 5.4 MIU/ML
HCT VFR BLD AUTO: 41 % (ref 35–45)
HGB BLD-MCNC: 14.1 G/DL (ref 11.7–15.5)
LH SERPL-ACNC: 16.5 MIU/ML
MCH RBC QN AUTO: 32.7 PG (ref 27–33)
MCHC RBC AUTO-ENTMCNC: 34.4 G/DL (ref 32–36)
MCV RBC AUTO: 95.1 FL (ref 80–100)
PLATELET # BLD AUTO: 276 THOUSAND/UL (ref 140–400)
PMV BLD REES-ECKER: 11.2 FL (ref 7.5–12.5)
PROGEST SERPL-MCNC: 0.7 NG/ML
RBC # BLD AUTO: 4.31 MILLION/UL (ref 3.8–5.1)
TESTOST FREE SERPL-MCNC: 17.3 PG/ML (ref 0.1–6.4)
TESTOST SERPL-MCNC: 92 NG/DL (ref 2–45)
TSH SERPL-ACNC: 2.46 MIU/L
WBC # BLD AUTO: 7 THOUSAND/UL (ref 3.8–10.8)

## 2022-06-28 ENCOUNTER — TELEPHONE (OUTPATIENT)
Dept: OBSTETRICS AND GYNECOLOGY | Facility: CLINIC | Age: 22
End: 2022-06-28
Payer: COMMERCIAL

## 2022-06-28 DIAGNOSIS — E28.2 PCOS (POLYCYSTIC OVARIAN SYNDROME): Primary | ICD-10-CM

## 2022-06-28 NOTE — TELEPHONE ENCOUNTER
Spoke w pt. Reviewed blood work results. Discussed tx/natural hx of PCOS.  Plan: Fasting insulin and 2 hr GTT ordered.  Continue w aygestin and begin ring w next withdraw bleed.

## 2022-06-29 RX ORDER — NORETHINDRONE 5 MG/1
TABLET ORAL
Qty: 30 TABLET | Refills: 0 | OUTPATIENT
Start: 2022-06-29

## 2022-06-29 NOTE — TELEPHONE ENCOUNTER
Needs 90 day supply      Medicine Refill Request    Last Office: 6/22/2022   Last Consult Visit: Visit date not found  Last Telemedicine Visit: Visit date not found    Next Appointment: 8/10/2022      Current Outpatient Medications:   •  ammonium lactate (LAC-HYDRIN) 12 % lotion, AmLactin 12 % lotion  APPLY TO ARMS TWICE DAILY AS DIRECTED, Disp: , Rfl:   •  cetirizine (ZyrTEC) 10 mg tablet, Take 10 mg by mouth daily., Disp: , Rfl:   •  cholecalciferol (VITAMIN D3) 1,250 mcg (50,000 unit) capsule, TAKE 1 CAPSULE BY MOUTH ONE TIME PER WEEK, Disp: , Rfl:   •  etonogestreL-ethinyl estradioL (NUVARING) 0.12-0.015 mg/24 hr vaginal ring, Insert 1 each into the vagina every 28 (twentyeight) days. Insert vaginally and leave in for 3 consecutive weeks, then remove for 1 week., Disp: 3 each, Rfl: 3  •  levothyroxine (SYNTHROID) 25 mcg tablet, Take 25 mcg by mouth daily., Disp: , Rfl:   •  norethindrone (AYGESTIN) 5 mg tablet, Take 2 tabs po BID for 3 days. Decrease to 1 tab po bid for 3 days. Decrease to 1 tab daily for 3 days, Disp: 30 tablet, Rfl: 0  •  OXcarbazepine (TRILEPTAL) 600 mg tablet, Take 600 mg by mouth 2 (two) times a day., Disp: , Rfl:       BP Readings from Last 3 Encounters:   06/22/22 118/70   05/03/22 118/78   03/31/22 126/82       Recent Lab results:  No results found for: CHOL, No results found for: HDL, No results found for: LDLCALC, No results found for: TRIG     No results found for: GLUCOSE, No results found for: HGBA1C      No results found for: CREATININE    Lab Results   Component Value Date    TSH 2.46 06/22/2022

## 2022-07-12 ENCOUNTER — TELEPHONE (OUTPATIENT)
Dept: OBSTETRICS AND GYNECOLOGY | Facility: CLINIC | Age: 22
End: 2022-07-12
Payer: COMMERCIAL

## 2022-07-14 ENCOUNTER — TELEPHONE (OUTPATIENT)
Dept: OBSTETRICS AND GYNECOLOGY | Facility: CLINIC | Age: 22
End: 2022-07-14
Payer: COMMERCIAL

## 2022-07-14 NOTE — TELEPHONE ENCOUNTER
Spoke with pt and stopped Nuvaring two days ago and started to bleed heavier, going through a tampon every 3 hours. Pt reassured to give this a week and if the bleeding does not significantly slow down or stop by 7 days, to call back. Given bleeding precautions and when to call back. Can consider restarting the Ring once bleeding stops.     ----- Message from Varsha Alejo MA sent at 7/14/2022  4:52 PM EDT -----  Regarding: FW: Birth Control    ----- Message -----  From: Leia Lawler  Sent: 7/14/2022   4:50 PM EDT  To: [facility] Clinical Support P  Subject: Birth Control                                    Hi Cora,     Today I felt like my period was worse. I found myself bleeding through my tampons multiple times not long after inserting them. I was wondering if there was another doctor you could talk to about this issue? Or is Dr. Perez is back? I am just concerned and would like to get this under control.     Thank you,   Leia Lawler

## 2022-07-17 LAB
GLUCOSE 1H P 75 G GLC PO SERPL-MCNC: 140 MG/DL
GLUCOSE 2H P 75 G GLC PO SERPL-MCNC: 112 MG/DL
GLUCOSE P FAST SERPL-MCNC: 90 MG/DL (ref 65–99)
INSULIN SERPL-ACNC: 9.9 UIU/ML
SERVICE CMNT-IMP: NORMAL

## 2022-07-18 ENCOUNTER — TELEPHONE (OUTPATIENT)
Dept: OBSTETRICS AND GYNECOLOGY | Facility: CLINIC | Age: 22
End: 2022-07-18
Payer: COMMERCIAL

## 2022-07-18 ENCOUNTER — TELEPHONE (OUTPATIENT)
Dept: OBGYN CLINIC | Facility: CLINIC | Age: 22
End: 2022-07-18

## 2022-08-12 ENCOUNTER — TELEPHONE (OUTPATIENT)
Dept: DERMATOLOGY | Facility: CLINIC | Age: 22
End: 2022-08-12

## 2022-08-29 NOTE — PATIENT INSTRUCTIONS
Pap every 3 years if normal, STI testing as indicated, exercise most days of week, obtain appropriate diet and hydration, Calcium 1000mg + 600 vit D daily,   Discussed dx PCOS implications symptom management Her biggest issue has been hair loss some cystic acne  Will start Spironolactone daily  Discussed Trileptal can interactand decrease effectiveness of OCP Could lead to preg as well as issue with BTB  Could trial with cont use of condoms  Hopes to come off Trileptal in Dec Would switch to OCP at that time     F/u in Spring for annual gyn

## 2022-08-29 NOTE — PROGRESS NOTES
Assessment/Plan:    Pap every 3 years if normal, STI testing as indicated, exercise most days of week, obtain appropriate diet and hydration, Calcium 1000mg + 600 vit D daily,   Discussed dx PCOS biggest issue has been hair loss some cystic acne  Will start Spironolactone daily  Discussed Trileptal can interactand decrease effectiveness of OCP Could lead to preg as well as issue with BTB  Could trial with cont use of condoms  Hopes to come off Trileptal in Dec Would switch to OCP at that time  F/u in Spring for annual gyn      Diagnoses and all orders for this visit:    PCOS (polycystic ovarian syndrome)    Other orders  -     ammonium lactate (LAC-HYDRIN) 12 % lotion; AmLactin 12 % lotion   APPLY TO ARMS TWICE DAILY AS DIRECTED  -     cetirizine (ZyrTEC) 10 mg tablet; Take 10 mg by mouth  -     ibuprofen (MOTRIN) 200 mg tablet; Take by mouth  -     levothyroxine 25 mcg tablet; TAKE 1 TABLET BY MOUTH EVERY MORNING MONDAY-FRIDAY AND 2 TABLETS ON SAT & SUNDAY  -     OXcarbazepine (TRILEPTAL) 600 mg tablet; Take 600 mg by mouth every 12 (twelve) hours  -     Retin-A Micro Pump 0 06 % GEL; APPLY SPARINGLY AND RUB IN WELL TO AFFECTED AREA ONCE DAILY AT BEDTIME  Subjective:      Patient ID: Mercdees Patel is a 24 y o  female  New pt here for consult regarding recent dx PCOS  Has been following with gyn on [facility] moved to [place] working for [facility]  She prev had IUD she  became SA and could feel it increase cramping and then dev  hair thinning so removed  She had a  normal period then had next period bled x 2 1/2 mos She took aygestin and when stopped her period returned Due to prolonged menses it was suspected PCOS  Labs confirmatory LMP  8/16/2022 normal period Met with derm for hairloss and rec anti androgen Spironolactone or OCP praneeth  On Trileptal for seizures  She was dx brain tumor then underwent resection   She then had a seizure they thought due to scar tissue   Neurologist  has discussed getting her off in December UTD annual gyn had in March PAP slightly abnormal was told would repeat in 1 year [facility] [facility]       The following portions of the patient's history were reviewed and updated as appropriate: allergies, current medications, past family history, past medical history, past social history, past surgical history and problem list     Review of Systems   Constitutional: Negative for chills, fatigue and fever  Hairloss   Gastrointestinal: Negative for abdominal pain, constipation and diarrhea  Genitourinary: Positive for menstrual problem  Negative for frequency and pelvic pain  Skin:        acne   Psychiatric/Behavioral: Negative for dysphoric mood  The patient is not nervous/anxious  Objective:      /65 (BP Location: Left arm, Patient Position: Sitting, Cuff Size: Large)   Ht 5' 1" (1 549 m)   Wt 96 3 kg (212 lb 3 2 oz)   LMP 08/16/2022 (Exact Date)   BMI 40 09 kg/m²          Physical Exam  Constitutional:       Appearance: Normal appearance  HENT:      Head: Normocephalic and atraumatic  Neurological:      General: No focal deficit present  Mental Status: She is alert and oriented to person, place, and time     Psychiatric:         Mood and Affect: Mood normal          Behavior: Behavior normal

## 2022-08-30 ENCOUNTER — OFFICE VISIT (OUTPATIENT)
Dept: OBGYN CLINIC | Facility: CLINIC | Age: 22
End: 2022-08-30

## 2022-08-30 VITALS
DIASTOLIC BLOOD PRESSURE: 65 MMHG | SYSTOLIC BLOOD PRESSURE: 109 MMHG | WEIGHT: 212.2 LBS | HEIGHT: 61 IN | BODY MASS INDEX: 40.06 KG/M2

## 2022-08-30 DIAGNOSIS — E28.2 PCOS (POLYCYSTIC OVARIAN SYNDROME): Primary | ICD-10-CM

## 2022-08-30 RX ORDER — OXCARBAZEPINE 600 MG/1
600 TABLET, FILM COATED ORAL EVERY 12 HOURS
COMMUNITY
Start: 2022-08-18

## 2022-08-30 RX ORDER — IBUPROFEN 200 MG
TABLET ORAL
COMMUNITY

## 2022-08-30 RX ORDER — AMMONIUM LACTATE 12 G/100G
LOTION TOPICAL
COMMUNITY

## 2022-08-30 RX ORDER — SPIRONOLACTONE 50 MG/1
50 TABLET, FILM COATED ORAL DAILY
Qty: 30 TABLET | Refills: 11 | Status: SHIPPED | OUTPATIENT
Start: 2022-08-30 | End: 2022-09-26

## 2022-08-30 RX ORDER — TRETINOIN 0.6 MG/G
GEL TOPICAL
COMMUNITY
Start: 2022-08-17

## 2022-08-30 RX ORDER — CETIRIZINE HYDROCHLORIDE 10 MG/1
10 TABLET ORAL
COMMUNITY

## 2022-08-30 RX ORDER — LEVOTHYROXINE SODIUM 0.03 MG/1
TABLET ORAL
COMMUNITY
Start: 2022-07-18

## 2022-09-26 DIAGNOSIS — E28.2 PCOS (POLYCYSTIC OVARIAN SYNDROME): ICD-10-CM

## 2022-09-26 RX ORDER — SPIRONOLACTONE 50 MG/1
TABLET, FILM COATED ORAL
Qty: 90 TABLET | Refills: 4 | Status: SHIPPED | OUTPATIENT
Start: 2022-09-26

## 2023-03-03 ENCOUNTER — NEW PATIENT (OUTPATIENT)
Dept: URBAN - METROPOLITAN AREA CLINIC 6 | Facility: CLINIC | Age: 23
End: 2023-03-03

## 2023-03-03 DIAGNOSIS — Z01.00: ICD-10-CM

## 2023-03-03 PROCEDURE — 92015 DETERMINE REFRACTIVE STATE: CPT

## 2023-03-03 PROCEDURE — 92004 COMPRE OPH EXAM NEW PT 1/>: CPT

## 2023-03-03 ASSESSMENT — VISUAL ACUITY
OS_SC: 20/25-1
OD_SC: 20/25
OS_SC: J1
OD_SC: J1

## 2023-05-08 NOTE — PROGRESS NOTES
Assessment/Plan:  Discussed Praneeth better antiandrogenic activity but pt would like to try alt OCP  On Spironolactone with some improvement can increase dose to bid   F/u 3 months for annual gyn        Diagnoses and all orders for this visit:    PCOS (polycystic ovarian syndrome)  -     spironolactone (ALDACTONE) 50 mg tablet; Take 1 tablet (50 mg total) by mouth 2 (two) times a day    Encounter for prescription of oral contraceptives  -     norethindrone-ethinyl estradiol (Loestrin Fe 1/20) 1-20 MG-MCG per tablet; Take 1 tablet by mouth daily          Subjective:      Patient ID: Fanny Neri is a 20 y o  female  Here to discuss birth control Last seen 8/30/2022 H/o PCOS Had  been following with gyn [facility] moved to [place] working for [employer]  [facility] She prev had IUD she  became SA and could feel it increase cramping and then dev  hair thinning so removed  She had a  normal period then had next period bled x 2 1/2 mos She took aygestin and when stopped her period returned again with prolonged bleeding Labs confirmatory for PCOS   Met with derm for hairloss and rec anti androgen Spironolactone or OCP praneeth  On Trileptal for seizures  She was dx brain tumor then underwent resection   She then had a seizure they thought due to scar tissue  Neurologist  has discussed getting her off in December UTD annual gyn had in March PAP slightly abnormal was told would repeat in 1 year Works [facility] Off trileptal 1 1/2 weeks Took a year to have seizure when d/cd last time already more energy spirinolactone 50 daily is helping Does not want praneeth         The following portions of the patient's history were reviewed and updated as appropriate: allergies, current medications, past family history, past medical history, past social history, past surgical history and problem list     Review of Systems      Objective:      /80 (BP Location: Right arm, Patient Position: Sitting, Cuff Size: Standard) "  Ht 5' 1\" (1 549 m)   Wt 94 8 kg (209 lb)   LMP 05/03/2023   BMI 39 49 kg/m²          Physical Exam  Constitutional:       Appearance: Normal appearance  HENT:      Head: Normocephalic and atraumatic  Neurological:      General: No focal deficit present  Mental Status: She is alert and oriented to person, place, and time     Psychiatric:         Mood and Affect: Mood normal          Behavior: Behavior normal          "

## 2023-05-09 ENCOUNTER — OFFICE VISIT (OUTPATIENT)
Dept: OBGYN CLINIC | Facility: CLINIC | Age: 23
End: 2023-05-09

## 2023-05-09 ENCOUNTER — TELEPHONE (OUTPATIENT)
Dept: OBGYN CLINIC | Facility: CLINIC | Age: 23
End: 2023-05-09

## 2023-05-09 VITALS
DIASTOLIC BLOOD PRESSURE: 80 MMHG | BODY MASS INDEX: 39.46 KG/M2 | WEIGHT: 209 LBS | HEIGHT: 61 IN | SYSTOLIC BLOOD PRESSURE: 120 MMHG

## 2023-05-09 DIAGNOSIS — E28.2 PCOS (POLYCYSTIC OVARIAN SYNDROME): Primary | ICD-10-CM

## 2023-05-09 DIAGNOSIS — Z30.011 ENCOUNTER FOR PRESCRIPTION OF ORAL CONTRACEPTIVES: ICD-10-CM

## 2023-05-09 RX ORDER — DROSPIRENONE AND ETHINYL ESTRADIOL 0.02-3(28)
1 KIT ORAL DAILY
Qty: 84 TABLET | Refills: 0 | Status: SHIPPED | OUTPATIENT
Start: 2023-05-09

## 2023-05-09 RX ORDER — SPIRONOLACTONE 50 MG/1
50 TABLET, FILM COATED ORAL 2 TIMES DAILY
Qty: 180 TABLET | Refills: 0 | Status: SHIPPED | OUTPATIENT
Start: 2023-05-09 | End: 2024-05-08

## 2023-05-09 RX ORDER — NORETHINDRONE ACETATE AND ETHINYL ESTRADIOL 1MG-20(21)
1 KIT ORAL DAILY
Qty: 84 TABLET | Refills: 0 | Status: SHIPPED | OUTPATIENT
Start: 2023-05-09 | End: 2023-05-09 | Stop reason: ALTCHOICE

## 2023-05-09 NOTE — TELEPHONE ENCOUNTER
Reviewed Paulina's message with Alicia Rivera   She plans to use the spironolactone in addition to the [facility]

## 2023-05-09 NOTE — TELEPHONE ENCOUNTER
Patient called stating she was in this morning to discuss other OCP options  She was thinking when she got home and wanted to try Rosie OCP instead as it is recommended with individual with PCOS  She request to switch her OCP to [facility] and please sent it to [facility] in [place] as on file  Since she will be switching to [facility], she will not need Spironolactone  She request a call once the script is sent  Bernice Kc please address

## 2023-05-09 NOTE — PATIENT INSTRUCTIONS
Discussed Rosie better antiandrogenic activity but pt would like to try alt OCP     On Spironolactone with some improvement can increase dose to bid   F/u 3 months for annual gyn

## 2023-07-25 DIAGNOSIS — Z30.011 ENCOUNTER FOR PRESCRIPTION OF ORAL CONTRACEPTIVES: ICD-10-CM

## 2023-07-26 RX ORDER — DROSPIRENONE AND ETHINYL ESTRADIOL 0.02-3(28)
1 KIT ORAL DAILY
Qty: 84 TABLET | Refills: 0 | Status: SHIPPED | OUTPATIENT
Start: 2023-07-26 | End: 2023-09-07 | Stop reason: DRUGHIGH

## 2023-08-04 DIAGNOSIS — E28.2 PCOS (POLYCYSTIC OVARIAN SYNDROME): ICD-10-CM

## 2023-08-08 RX ORDER — SPIRONOLACTONE 50 MG/1
50 TABLET, FILM COATED ORAL 2 TIMES DAILY
Qty: 180 TABLET | Refills: 0 | Status: SHIPPED | OUTPATIENT
Start: 2023-08-08

## 2023-09-07 ENCOUNTER — ANNUAL EXAM (OUTPATIENT)
Dept: OBGYN CLINIC | Facility: CLINIC | Age: 23
End: 2023-09-07
Payer: COMMERCIAL

## 2023-09-07 VITALS
DIASTOLIC BLOOD PRESSURE: 66 MMHG | WEIGHT: 212 LBS | BODY MASS INDEX: 39.01 KG/M2 | HEIGHT: 62 IN | SYSTOLIC BLOOD PRESSURE: 112 MMHG

## 2023-09-07 DIAGNOSIS — Z12.4 ENCOUNTER FOR PAPANICOLAOU SMEAR FOR CERVICAL CANCER SCREENING: ICD-10-CM

## 2023-09-07 DIAGNOSIS — E28.2 PCOS (POLYCYSTIC OVARIAN SYNDROME): ICD-10-CM

## 2023-09-07 DIAGNOSIS — Z13.0 SCREENING FOR ENDOCRINE, METABOLIC AND IMMUNITY DISORDER: ICD-10-CM

## 2023-09-07 DIAGNOSIS — Z13.228 SCREENING FOR ENDOCRINE, METABOLIC AND IMMUNITY DISORDER: ICD-10-CM

## 2023-09-07 DIAGNOSIS — Z13.29 SCREENING FOR ENDOCRINE, METABOLIC AND IMMUNITY DISORDER: ICD-10-CM

## 2023-09-07 DIAGNOSIS — Z30.011 ENCOUNTER FOR PRESCRIPTION OF ORAL CONTRACEPTIVES: ICD-10-CM

## 2023-09-07 DIAGNOSIS — Z01.419 ENCOUNTER FOR GYNECOLOGICAL EXAMINATION WITHOUT ABNORMAL FINDING: Primary | ICD-10-CM

## 2023-09-07 DIAGNOSIS — Z11.3 SCREEN FOR STD (SEXUALLY TRANSMITTED DISEASE): ICD-10-CM

## 2023-09-07 PROCEDURE — 99395 PREV VISIT EST AGE 18-39: CPT | Performed by: NURSE PRACTITIONER

## 2023-09-07 RX ORDER — DROSPIRENONE AND ETHINYL ESTRADIOL 0.03MG-3MG
1 KIT ORAL DAILY
Qty: 84 TABLET | Refills: 3 | Status: SHIPPED | OUTPATIENT
Start: 2023-09-07

## 2023-09-07 NOTE — PATIENT INSTRUCTIONS
Pap every 3 years if normal, STI testing as indicated, exercise most days of week, obtain appropriate diet and hydration, Calcium 1000mg + 600 vit D daily. Annual mammogram starting at age 36, monthly breast self exam.  Bleeding first week of pill pack will switch to mahamed slightly higher dose and 7 placebos to allow more of period placebo week   Hypothyroid will check TSH   Call with cont issue  PCOS on Spironolactone through derm for hairloss.  That in combination with Rosie can elevate K+ level will check chem panel

## 2023-09-07 NOTE — PROGRESS NOTES
Assessment/Plan:  Pap every 3 years if normal, STI testing as indicated, exercise most days of week, obtain appropriate diet and hydration, Calcium 1000mg + 600 vit D daily. Annual mammogram starting at age 35, monthly breast self exam.  Bleeding first week of pill pack will switch to orestes slightly higher dose and 7 placebos to allow more of period placebo week   Hypothyroid will check TSH   Call with cont issue  PCOS on Spironolactone through derm for hairloss. That in combination with Praneeth can elevate K+ level will check chem panel        Diagnoses and all orders for this visit:    Encounter for gynecological examination without abnormal finding  -     Thinprep Tis Pap Reflex HPV mRNA E6/E7, Chlamydia/N.gonorrhoeae    PCOS (polycystic ovarian syndrome)    Encounter for Papanicolaou smear for cervical cancer screening  -     Thinprep Tis Pap Reflex HPV mRNA E6/E7, Chlamydia/N.gonorrhoeae    Screen for STD (sexually transmitted disease)  -     Thinprep Tis Pap Reflex HPV mRNA E6/E7, Chlamydia/N.gonorrhoeae    Encounter for prescription of oral contraceptives  -     drospirenone-ethinyl estradiol (ORESTES) 3-0.03 MG per tablet; Take 1 tablet by mouth daily May take continuous active pills eliminate placebo    Screening for endocrine, metabolic and immunity disorder  -     CBC and Platelet; Future  -     TSH, 3rd generation with Free T4 reflex; Future  -     Comprehensive metabolic panel; Future  -     CBC and Platelet  -     TSH, 3rd generation with Free T4 reflex  -     Comprehensive metabolic panel    Other orders  -     Liquid-based pap, screening          Subjective:      Patient ID: Justin Galavn is a 20 y.o. female. Here for annual gyn and to discuss BC H/O PCOS with irreg to prolonged bleeding  Followed with derm for hairloss and rec anti androgen Spironolactone or OCP praneeth On spironolactone bid  On Trileptal for seizures. She was dx brain tumor then underwent resection .   She then had a seizure they thought due to scar tissue. Trial coming off again Last time tried had a seizure 1 year later she decided to start praneeth. Has been getting period end of placebos and lasting 8 days of new pack [facility]  PAP 5/2022 ASCUS neg HPV for repeat today       The following portions of the patient's history were reviewed and updated as appropriate: allergies, current medications, past family history, past medical history, past social history, past surgical history and problem list.    Review of Systems   Constitutional: Negative for fatigue and unexpected weight change. Gastrointestinal: Negative for abdominal distention, abdominal pain, constipation and diarrhea. Genitourinary: Positive for vaginal bleeding. Negative for difficulty urinating, dyspareunia, dysuria, frequency, genital sores, menstrual problem, pelvic pain, urgency, vaginal discharge and vaginal pain. Neurological: Negative for headaches. Psychiatric/Behavioral: Negative. Negative for dysphoric mood. The patient is not nervous/anxious. Objective:      /66   Ht 5' 2" (1.575 m)   Wt 96.2 kg (212 lb)   LMP 08/31/2023 (Exact Date)   BMI 38.78 kg/m²          Physical Exam  Vitals and nursing note reviewed. Constitutional:       General: She is not in acute distress. Appearance: Normal appearance. HENT:      Head: Normocephalic and atraumatic. Pulmonary:      Effort: Pulmonary effort is normal.   Chest:   Breasts:     Breasts are symmetrical.      Right: Normal. No mass, nipple discharge, skin change or tenderness. Left: Normal. No mass, nipple discharge, skin change or tenderness. Abdominal:      General: There is no distension. Palpations: Abdomen is soft. Tenderness: There is no abdominal tenderness. There is no guarding or rebound. Genitourinary:     General: Normal vulva. Exam position: Lithotomy position. Labia:         Right: No rash, tenderness, lesion or injury.          Left: No rash, tenderness, lesion or injury. Urethra: No prolapse, urethral pain, urethral swelling or urethral lesion. Vagina: Normal. No erythema or lesions. Cervix: No cervical motion tenderness, discharge, lesion or cervical bleeding. Uterus: Normal.       Adnexa: Right adnexa normal and left adnexa normal.        Right: No mass or tenderness. Left: No mass or tenderness. Rectum: No mass or external hemorrhoid. Comments: PAP from cervix  Musculoskeletal:         General: Normal range of motion. Lymphadenopathy:      Upper Body:      Right upper body: No axillary adenopathy. Left upper body: No axillary adenopathy. Lower Body: No right inguinal adenopathy. No left inguinal adenopathy. Skin:     General: Skin is warm and dry. Neurological:      Mental Status: She is alert and oriented to person, place, and time. Psychiatric:         Mood and Affect: Mood normal.         Behavior: Behavior normal.         Thought Content:  Thought content normal.         Judgment: Judgment normal.

## 2023-09-08 LAB
C TRACH RRNA SPEC QL NAA+PROBE: NOT DETECTED
N GONORRHOEA RRNA SPEC QL NAA+PROBE: NOT DETECTED

## 2023-09-11 LAB
C TRACH RRNA SPEC QL NAA+PROBE: NOT DETECTED
CLINICAL INFO: NORMAL
CYTO CVX: NORMAL
CYTOLOGY CMNT CVX/VAG CYTO-IMP: NORMAL
DATE PREVIOUS BX: NORMAL
LMP START DATE: NORMAL
N GONORRHOEA RRNA SPEC QL NAA+PROBE: NOT DETECTED
SL AMB PREV. PAP:: NORMAL
SPECIMEN SOURCE CVX/VAG CYTO: NORMAL

## 2023-10-22 DIAGNOSIS — E28.2 PCOS (POLYCYSTIC OVARIAN SYNDROME): ICD-10-CM

## 2023-10-22 RX ORDER — SPIRONOLACTONE 50 MG/1
50 TABLET, FILM COATED ORAL DAILY
Qty: 90 TABLET | Refills: 2 | Status: SHIPPED | OUTPATIENT
Start: 2023-10-22

## 2023-11-18 DIAGNOSIS — Z30.011 ENCOUNTER FOR PRESCRIPTION OF ORAL CONTRACEPTIVES: ICD-10-CM

## 2023-11-21 RX ORDER — DROSPIRENONE AND ETHINYL ESTRADIOL 0.03MG-3MG
1 KIT ORAL DAILY
Qty: 84 TABLET | Refills: 0 | Status: SHIPPED | OUTPATIENT
Start: 2023-11-21

## 2023-11-21 NOTE — TELEPHONE ENCOUNTER
Spoke with patient and informed her that she has 3 refills left on her ocp script. Patient reports that the birth control is now working regularly for her. Patient had called earlier in September (telephone encounter 9/19/23) reporting that she was bleeding irregularly, but now feels like she is regular. Patient verbalized understanding and appreciated phone call.

## 2023-12-19 LAB
ALBUMIN SERPL-MCNC: 3.9 G/DL (ref 3.6–5.1)
ALBUMIN/GLOB SERPL: 1.6 (CALC) (ref 1–2.5)
ALP SERPL-CCNC: 47 U/L (ref 31–125)
ALT SERPL-CCNC: 13 U/L (ref 6–29)
AST SERPL-CCNC: 12 U/L (ref 10–30)
BILIRUB SERPL-MCNC: 0.3 MG/DL (ref 0.2–1.2)
BUN SERPL-MCNC: 13 MG/DL (ref 7–25)
BUN/CREAT SERPL: NORMAL (CALC) (ref 6–22)
CALCIUM SERPL-MCNC: 9.2 MG/DL (ref 8.6–10.2)
CHLORIDE SERPL-SCNC: 103 MMOL/L (ref 98–110)
CO2 SERPL-SCNC: 27 MMOL/L (ref 20–32)
CREAT SERPL-MCNC: 0.76 MG/DL (ref 0.5–0.96)
ERYTHROCYTE [DISTWIDTH] IN BLOOD BY AUTOMATED COUNT: 12.4 % (ref 11–15)
GFR/BSA.PRED SERPLBLD CYS-BASED-ARV: 112 ML/MIN/1.73M2
GLOBULIN SER CALC-MCNC: 2.5 G/DL (CALC) (ref 1.9–3.7)
GLUCOSE SERPL-MCNC: 99 MG/DL (ref 65–99)
HCT VFR BLD AUTO: 39 % (ref 35–45)
HGB BLD-MCNC: 13.1 G/DL (ref 11.7–15.5)
MCH RBC QN AUTO: 31.4 PG (ref 27–33)
MCHC RBC AUTO-ENTMCNC: 33.6 G/DL (ref 32–36)
MCV RBC AUTO: 93.5 FL (ref 80–100)
PLATELET # BLD AUTO: 271 THOUSAND/UL (ref 140–400)
PMV BLD REES-ECKER: 11.1 FL (ref 7.5–12.5)
POTASSIUM SERPL-SCNC: 4.1 MMOL/L (ref 3.5–5.3)
PROT SERPL-MCNC: 6.4 G/DL (ref 6.1–8.1)
RBC # BLD AUTO: 4.17 MILLION/UL (ref 3.8–5.1)
SODIUM SERPL-SCNC: 137 MMOL/L (ref 135–146)
TSH SERPL-ACNC: 2.81 MIU/L
WBC # BLD AUTO: 6.2 THOUSAND/UL (ref 3.8–10.8)

## 2024-01-11 DIAGNOSIS — E28.2 PCOS (POLYCYSTIC OVARIAN SYNDROME): ICD-10-CM

## 2024-01-11 RX ORDER — SPIRONOLACTONE 50 MG/1
50 TABLET, FILM COATED ORAL 2 TIMES DAILY
Qty: 180 TABLET | Refills: 2 | Status: SHIPPED | OUTPATIENT
Start: 2024-01-11

## 2024-02-15 ENCOUNTER — PATIENT MESSAGE (OUTPATIENT)
Dept: OBGYN CLINIC | Facility: CLINIC | Age: 24
End: 2024-02-15

## 2024-02-15 DIAGNOSIS — Z30.011 ENCOUNTER FOR PRESCRIPTION OF ORAL CONTRACEPTIVES: ICD-10-CM

## 2024-02-16 RX ORDER — DROSPIRENONE AND ETHINYL ESTRADIOL 0.03MG-3MG
1 KIT ORAL DAILY
Qty: 84 TABLET | Refills: 2 | Status: SHIPPED | OUTPATIENT
Start: 2024-02-16

## 2024-02-16 NOTE — PATIENT COMMUNICATION
Review of chart shows she was given script at 9/2023 visit with refills for the year.  For some reason it appears in November that script was cancelled.  I am not sure why.  It appears that when Paulina got the request 2/14/2024 she assumed that there where still refills.   I sent script to pharmacy with enough refill to take patient to 9/2024 when her Wellness is due.      Msg to nurses to call her to explain.

## 2024-06-19 ENCOUNTER — TELEPHONE (OUTPATIENT)
Age: 24
End: 2024-06-19

## 2024-06-19 NOTE — TELEPHONE ENCOUNTER
Patient called and she is moving out of the area and [facility] and she will have them fax over a medical records request. Thank you

## 2024-10-16 DIAGNOSIS — Z30.011 ENCOUNTER FOR PRESCRIPTION OF ORAL CONTRACEPTIVES: ICD-10-CM

## 2024-10-16 RX ORDER — DROSPIRENONE AND ETHINYL ESTRADIOL 0.03MG-3MG
1 KIT ORAL DAILY
Qty: 84 TABLET | Refills: 1 | Status: SHIPPED | OUTPATIENT
Start: 2024-10-16

## 2025-01-14 DIAGNOSIS — E28.2 PCOS (POLYCYSTIC OVARIAN SYNDROME): ICD-10-CM

## 2025-01-15 RX ORDER — SPIRONOLACTONE 50 MG/1
50 TABLET, FILM COATED ORAL DAILY
Qty: 90 TABLET | Refills: 2 | OUTPATIENT
Start: 2025-01-15